# Patient Record
Sex: MALE | Race: ASIAN | NOT HISPANIC OR LATINO | Employment: FULL TIME | URBAN - METROPOLITAN AREA
[De-identification: names, ages, dates, MRNs, and addresses within clinical notes are randomized per-mention and may not be internally consistent; named-entity substitution may affect disease eponyms.]

---

## 2019-02-04 ENCOUNTER — OFFICE VISIT (OUTPATIENT)
Dept: URGENT CARE | Facility: CLINIC | Age: 34
End: 2019-02-04
Payer: OTHER MISCELLANEOUS

## 2019-02-04 VITALS
DIASTOLIC BLOOD PRESSURE: 80 MMHG | SYSTOLIC BLOOD PRESSURE: 100 MMHG | HEIGHT: 71 IN | WEIGHT: 170 LBS | BODY MASS INDEX: 23.8 KG/M2 | HEART RATE: 60 BPM | TEMPERATURE: 98 F | OXYGEN SATURATION: 98 % | RESPIRATION RATE: 16 BRPM

## 2019-02-04 DIAGNOSIS — S83.412A SPRAIN OF MEDIAL COLLATERAL LIGAMENT OF LEFT KNEE, INITIAL ENCOUNTER: ICD-10-CM

## 2019-02-04 PROCEDURE — 99204 OFFICE O/P NEW MOD 45 MIN: CPT | Performed by: PHYSICIAN ASSISTANT

## 2019-02-04 ASSESSMENT — ENCOUNTER SYMPTOMS
GASTROINTESTINAL NEGATIVE: 1
CONSTITUTIONAL NEGATIVE: 1
NEUROLOGICAL NEGATIVE: 1

## 2019-02-04 NOTE — PROGRESS NOTES
"Subjective:      Quintin Addison is a 33 y.o. male who presents with Knee Injury (while snowboarding hit snow lump and his kneew was other direction, heard pop, iced and elevated last night, today compression but pain has increased)        Knee Injury     Patient presents today for about 24 hours of very slightly improved left knee pain. Patient was snowboarding when he hit a large lump of knee that caused him to lose control, sending his body and knee in different directions.  He hear a pop in his left knee.  He states he went home, iced and elevated the knee all night.  He woke up without swelling and feeling ok until he had to walk on it and noticed the pain was still there.  He has applied topical pain relief and a compression sleeve which helped with stability.  No hx of knee injury on the left.  No numbness, tingling or distal swelling.     Review of Systems   Constitutional: Negative.    Gastrointestinal: Negative.    Musculoskeletal:        SEE HPI   Skin: Negative.    Neurological: Negative.    Endo/Heme/Allergies: Negative.        PMH:  has no past medical history on file.  MEDS: No current outpatient prescriptions on file.  ALLERGIES: No Known Allergies  SURGHX: No past surgical history on file.  SOCHX:    FH: Family history was reviewed, no pertinent findings to report   Objective:     /80   Pulse 60   Temp 36.7 °C (98 °F)   Ht 1.803 m (5' 11\")   Wt 77.1 kg (170 lb)   SpO2 98%   BMI 23.71 kg/m²      Physical Exam   Constitutional: He is oriented to person, place, and time. He appears well-developed and well-nourished. No distress.   HENT:   Head: Normocephalic and atraumatic.   Eyes: Conjunctivae and EOM are normal.   Neck: Normal range of motion. Neck supple.   Cardiovascular: Normal rate.    Pulmonary/Chest: Effort normal.   Musculoskeletal:        Left knee: He exhibits swelling (minimal medial aspect). He exhibits normal range of motion, no effusion, no ecchymosis, normal alignment, no LCL " laxity, normal patellar mobility, no bony tenderness, normal meniscus and no MCL laxity. Tenderness found. Medial joint line and MCL tenderness noted. No lateral joint line, no LCL and no patellar tendon tenderness noted.        Legs:  Neurological: He is alert and oriented to person, place, and time.   Skin: Skin is warm and dry. No rash noted.   Psychiatric: He has a normal mood and affect. His behavior is normal.   Vitals reviewed.              Assessment/Plan:     1. Sprain of medial collateral ligament of left knee, initial encounter  REFERRAL TO SPORTS MEDICINE       -RAD as above, also reviewed by myself.   -RICE therapy discussed in detail.   Patient declines crutches at this time and will continue compression sleeve he has   -NSAIDs encouraged prn OTC  -consider Sports Med follow up in 7-10 days if good conservative care does not warrant improvement by that time.       Supportive care, differential diagnoses, and indications for immediate follow-up discussed with patient.   Pathogenesis of diagnosis discussed including typical length and natural progression.   Instructed to return to clinic or nearest emergency department for any change in condition, further concerns, or worsening of symptoms.  Patient states understanding of the plan of care and discharge instructions.          Britni Frost P.A.-C.